# Patient Record
Sex: MALE | URBAN - METROPOLITAN AREA
[De-identification: names, ages, dates, MRNs, and addresses within clinical notes are randomized per-mention and may not be internally consistent; named-entity substitution may affect disease eponyms.]

---

## 2024-01-04 ENCOUNTER — TELEPHONE (OUTPATIENT)
Dept: SURGERY | Age: 69
End: 2024-01-04

## 2024-01-04 NOTE — TELEPHONE ENCOUNTER
Referral received via fax from pcp office. Patient needs a screening colonoscopy. Will need to do a questionnaire. I left a vm for patient and scanned referral